# Patient Record
Sex: MALE | Race: BLACK OR AFRICAN AMERICAN
[De-identification: names, ages, dates, MRNs, and addresses within clinical notes are randomized per-mention and may not be internally consistent; named-entity substitution may affect disease eponyms.]

---

## 2017-03-23 ENCOUNTER — HOSPITAL ENCOUNTER (EMERGENCY)
Dept: HOSPITAL 10 - FTE | Age: 52
Discharge: HOME | End: 2017-03-23
Payer: COMMERCIAL

## 2017-03-23 VITALS
TEMPERATURE: 98.4 F | DIASTOLIC BLOOD PRESSURE: 98 MMHG | SYSTOLIC BLOOD PRESSURE: 167 MMHG | HEART RATE: 61 BPM | RESPIRATION RATE: 18 BRPM

## 2017-03-23 VITALS
HEIGHT: 71 IN | WEIGHT: 188.5 LBS | BODY MASS INDEX: 26.39 KG/M2 | BODY MASS INDEX: 26.39 KG/M2 | HEIGHT: 71 IN | WEIGHT: 188.5 LBS

## 2017-03-23 DIAGNOSIS — M19.072: Primary | ICD-10-CM

## 2017-03-23 DIAGNOSIS — F17.210: ICD-10-CM

## 2017-03-23 DIAGNOSIS — I10: ICD-10-CM

## 2017-03-23 PROCEDURE — 99283 EMERGENCY DEPT VISIT LOW MDM: CPT

## 2017-03-23 NOTE — ERD
ER Documentation


Chief Complaint


Date/Time


DATE: 3/23/17 


TIME: 20:02


Chief Complaint


LEFT ANKLE PAIN AND SWELLING.Pt ACCIDENTALLY TWISTED HIS ANKLE.





HPI


52-year-old man with a history of left ankle pain complains of increasing pain 

to the left ankle similar to previous episodes.  He does have a long history of 

left ankle injury and usually ambulates with a walker.  He denies recent trauma 

but ran out of his analgesics.  He denies redness or swelling to the ankle or 

foot, no fevers or chills, no paresis or paresthesias.  He states he was in the 

 and injured his ankle many years ago.  No history of surgery.





ROS


All systems reviewed and are negative except as per history of present illness.





Medications


Home Meds


Active Scripts


Oxycodone HCl/Acetaminophen (Percocet 5-325 mg Tablet) 1 Each Tablet, 1 EACH PO 

TID for PAIN, #12 TAB


   Prov:AMMON NESS MD         3/23/17





PMhx/Soc


Chronic pain


History of Surgery:  Yes (L ankle fx repair)


Anesthesia Reaction:  No


Hx Neurological Disorder:  No


Hx Cardiac Disorders:  Yes (HTN)


Hx Psychiatric Problems:  Yes (PTSD)


Hx Miscellaneous Medical Probl:  Yes (Chronic pain)


Hx Alcohol Use:  No


Hx Substance Use:  No


Hx Tobacco Use:  Yes


Smoking Status:  Current every day smoker





FmHx


Family History:  No diabetes





Physical Exam


Vitals





Vital Signs








  Date Time  Temp Pulse Resp B/P Pulse Ox O2 Delivery O2 Flow Rate FiO2


 


3/23/17 18:09 98.4 77 18 146/93 98   








Physical Exam


GENERAL: Well-developed, well-nourished, well-hydrated, in no apparent distress

, looks nontoxic in appearance


HEENT: Moist mucous membranes, pink conjunctiva, no cervical spine tenderness 

or step-off deformities, no goiter, no jaundice or icterus, extraocular 

movements intact without pain. No submandibular induration, and no pharyngeal 

erythema


NEURO: Alert and oriented 3, cranial nerves II through XII intact bilaterally, 

pupils equal round reactive to light, no focal deficits or facial asymmetry, 

sensation intact distally Strength 5/5 in upper and lower extremities 

bilaterally


CARDIAC: Regular rate and rhythm, no murmurs rubs or gallops


LUNGS: Clear bilaterally no wheezing crackles or stridor


ABDOMEN: Soft nontender, no guarding, no rigidity, no rebound, no psoas sign no 

obturator sign. Normoactive bowel sounds


SKIN: Warm and dry to touch, no abrasions, contusions, or hematomas, no 

lacerations, no ecchymosis, no target lesions, and without ulcers


EXTREMITIES: No clubbing cyanosis or edema, calves are bilaterally symmetrical, 

no Homans sign, no popliteal cord sign. Distal pulses equal and bilateral


PSYCH: Normal affect without agitation or irritability


Results 24 hrs





 Current Medications








 Medications


  (Trade)  Dose


 Ordered  Sig/Jerson


 Route


 PRN Reason  Start Time


 Stop Time Status Last Admin


Dose Admin


 


 Oxycodone/


 Acetaminophen


  (Percocet (5/


 325))  1 tab  ONCE  ONCE


 PO


   3/23/17 20:00


 3/23/17 20:01 DC 3/23/17 20:01


 











Procedures/MDM


I administered Percocet 1 tablet p.o. for pain control.





Patient has no soft tissue swelling or bony tenderness noted on exam, no 

erythema.  He states the pain is chronic and recurrent.  I suspect arthritis 

secondary to previous injury and gave him a short prescription of Percocet to 

use as needed for severe pain.  I did tell him to return if he develops fever, 

redness, or sustains any new injury.





Patient feels much better at this time, and vital signs are normal, symptoms 

have improved. I did give strict instructions to return to the ED if symptoms 

continue or worsen, patient will otherwise follow-up with primary care 

physician. Patient understood instructions and agreed to plan.





Departure


Diagnosis:  


 Primary Impression:  


 Arthritis


 Additional Impression:  


 Chronic ankle pain


 Laterality:  left  Qualified Code:  M25.572 - Chronic pain of left ankle


Condition:  Good


Patient Instructions:  What Is Arthritis?, Chronic Pain











AMMON NESS MD Mar 23, 2017 20:05

## 2017-07-05 ENCOUNTER — HOSPITAL ENCOUNTER (EMERGENCY)
Dept: HOSPITAL 10 - E/R | Age: 52
Discharge: LEFT BEFORE DISCHARGE | End: 2017-07-05
Payer: SELF-PAY

## 2017-07-05 DIAGNOSIS — Z53.21: Primary | ICD-10-CM

## 2018-07-24 ENCOUNTER — HOSPITAL ENCOUNTER (EMERGENCY)
Age: 53
Discharge: HOME | End: 2018-07-24

## 2018-07-24 ENCOUNTER — HOSPITAL ENCOUNTER (EMERGENCY)
Dept: HOSPITAL 91 - FTE | Age: 53
Discharge: HOME | End: 2018-07-24
Payer: COMMERCIAL

## 2018-07-24 DIAGNOSIS — R51: Primary | ICD-10-CM

## 2018-07-24 DIAGNOSIS — F17.210: ICD-10-CM

## 2018-07-24 DIAGNOSIS — I10: ICD-10-CM

## 2018-07-24 DIAGNOSIS — R11.0: ICD-10-CM

## 2018-07-24 LAB
ADD MAN DIFF?: NO
ANION GAP: 14 (ref 8–16)
BASOPHIL #: 0 10^3/UL (ref 0–0.1)
BASOPHILS %: 0.2 % (ref 0–2)
BLOOD UREA NITROGEN: 15 MG/DL (ref 7–20)
CALCIUM: 9.2 MG/DL (ref 8.4–10.2)
CARBON DIOXIDE: 26 MMOL/L (ref 21–31)
CHLORIDE: 104 MMOL/L (ref 97–110)
CREATININE: 1.03 MG/DL (ref 0.61–1.24)
EOSINOPHILS #: 0.7 10^3/UL (ref 0–0.5)
EOSINOPHILS %: 7.3 % (ref 0–7)
GLUCOSE: 80 MG/DL (ref 70–220)
HEMATOCRIT: 43.7 % (ref 42–52)
HEMOGLOBIN: 15.4 G/DL (ref 14–18)
LYMPHOCYTES #: 2.4 10^3/UL (ref 0.8–2.9)
LYMPHOCYTES %: 26.6 % (ref 15–51)
MEAN CORPUSCULAR HEMOGLOBIN: 31.1 PG (ref 29–33)
MEAN CORPUSCULAR HGB CONC: 35.2 G/DL (ref 32–37)
MEAN CORPUSCULAR VOLUME: 88.3 FL (ref 82–101)
MEAN PLATELET VOLUME: 8.4 FL (ref 7.4–10.4)
MONOCYTE #: 0.7 10^3/UL (ref 0.3–0.9)
MONOCYTES %: 7.7 % (ref 0–11)
NEUTROPHIL #: 5.3 10^3/UL (ref 1.6–7.5)
NEUTROPHILS %: 57.9 % (ref 39–77)
NUCLEATED RED BLOOD CELLS #: 0 10^3/UL (ref 0–0)
NUCLEATED RED BLOOD CELLS%: 0 /100WBC (ref 0–0)
PLATELET COUNT: 250 10^3/UL (ref 140–415)
POTASSIUM: 3.6 MMOL/L (ref 3.5–5.1)
RED BLOOD COUNT: 4.95 10^6/UL (ref 4.7–6.1)
RED CELL DISTRIBUTION WIDTH: 13.7 % (ref 11.5–14.5)
SODIUM: 140 MMOL/L (ref 135–144)
URINE PH (DIP) POC: 6 (ref 5–8.5)
URINE TOTAL PROTEIN POC: (no result)
WHITE BLOOD COUNT: 9.1 10^3/UL (ref 4.8–10.8)

## 2018-07-24 PROCEDURE — 36415 COLL VENOUS BLD VENIPUNCTURE: CPT

## 2018-07-24 PROCEDURE — 70450 CT HEAD/BRAIN W/O DYE: CPT

## 2018-07-24 PROCEDURE — 96374 THER/PROPH/DIAG INJ IV PUSH: CPT

## 2018-07-24 PROCEDURE — 96361 HYDRATE IV INFUSION ADD-ON: CPT

## 2018-07-24 PROCEDURE — 81003 URINALYSIS AUTO W/O SCOPE: CPT

## 2018-07-24 PROCEDURE — 96375 TX/PRO/DX INJ NEW DRUG ADDON: CPT

## 2018-07-24 PROCEDURE — 99285 EMERGENCY DEPT VISIT HI MDM: CPT

## 2018-07-24 PROCEDURE — 85025 COMPLETE CBC W/AUTO DIFF WBC: CPT

## 2018-07-24 PROCEDURE — 80048 BASIC METABOLIC PNL TOTAL CA: CPT

## 2018-07-24 RX ADMIN — THIAMINE HYDROCHLORIDE 1 MLS/HR: 100 INJECTION, SOLUTION INTRAMUSCULAR; INTRAVENOUS at 18:55

## 2018-07-24 RX ADMIN — ONDANSETRON HYDROCHLORIDE 1 MG: 2 INJECTION, SOLUTION INTRAMUSCULAR; INTRAVENOUS at 18:54

## 2018-09-16 ENCOUNTER — HOSPITAL ENCOUNTER (INPATIENT)
Dept: HOSPITAL 91 - E/R | Age: 53
LOS: 2 days | Discharge: HOME | DRG: 201 | End: 2018-09-18
Payer: COMMERCIAL

## 2018-09-16 ENCOUNTER — HOSPITAL ENCOUNTER (INPATIENT)
Age: 53
LOS: 2 days | Discharge: HOME | DRG: 201 | End: 2018-09-18

## 2018-09-16 DIAGNOSIS — E03.9: ICD-10-CM

## 2018-09-16 DIAGNOSIS — I10: ICD-10-CM

## 2018-09-16 DIAGNOSIS — J93.83: Primary | ICD-10-CM

## 2018-09-16 DIAGNOSIS — J43.9: ICD-10-CM

## 2018-09-16 DIAGNOSIS — F17.210: ICD-10-CM

## 2018-09-16 DIAGNOSIS — E78.5: ICD-10-CM

## 2018-09-16 LAB
ADD MAN DIFF?: NO
ALANINE AMINOTRANSFERASE: 28 IU/L (ref 13–69)
ALBUMIN/GLOBULIN RATIO: 1.37
ALBUMIN: 4.4 G/DL (ref 3.3–4.9)
ALKALINE PHOSPHATASE: 63 IU/L (ref 42–121)
ANION GAP: 13 (ref 8–16)
ASPARTATE AMINO TRANSFERASE: 39 IU/L (ref 15–46)
BASOPHIL #: 0 10^3/UL (ref 0–0.1)
BASOPHILS %: 0.2 % (ref 0–2)
BILIRUBIN,DIRECT: 0 MG/DL (ref 0–0.2)
BILIRUBIN,TOTAL: 0.4 MG/DL (ref 0.2–1.3)
BLOOD UREA NITROGEN: 12 MG/DL (ref 7–20)
CALCIUM: 9.5 MG/DL (ref 8.4–10.2)
CARBON DIOXIDE: 29 MMOL/L (ref 21–31)
CHLORIDE: 104 MMOL/L (ref 97–110)
CREATININE: 1.06 MG/DL (ref 0.61–1.24)
D-DIMER: 450.97 NG/ML (ref ?–460)
EOSINOPHILS #: 0.5 10^3/UL (ref 0–0.5)
EOSINOPHILS %: 5.2 % (ref 0–7)
GLOBULIN: 3.2 G/DL (ref 1.3–3.2)
GLUCOSE: 136 MG/DL (ref 70–220)
HEMATOCRIT: 44.6 % (ref 42–52)
HEMOGLOBIN: 15.6 G/DL (ref 14–18)
IMMATURE GRANS #M: 0.01 10^3/UL (ref 0–0.03)
IMMATURE GRANS % (M): 0.1 % (ref 0–0.43)
LIPASE: 77 U/L (ref 23–300)
LYMPHOCYTES #: 2.5 10^3/UL (ref 0.8–2.9)
LYMPHOCYTES %: 28.5 % (ref 15–51)
MEAN CORPUSCULAR HEMOGLOBIN: 30.2 PG (ref 29–33)
MEAN CORPUSCULAR HGB CONC: 35 G/DL (ref 32–37)
MEAN CORPUSCULAR VOLUME: 86.3 FL (ref 82–101)
MEAN PLATELET VOLUME: 8.7 FL (ref 7.4–10.4)
MONOCYTE #: 0.6 10^3/UL (ref 0.3–0.9)
MONOCYTES %: 6.6 % (ref 0–11)
NEUTROPHIL #: 5.3 10^3/UL (ref 1.6–7.5)
NEUTROPHILS %: 59.4 % (ref 39–77)
NUCLEATED RED BLOOD CELLS #: 0 10^3/UL (ref 0–0)
NUCLEATED RED BLOOD CELLS%: 0 /100WBC (ref 0–0)
PLATELET COUNT: 248 10^3/UL (ref 140–415)
POTASSIUM: 3.5 MMOL/L (ref 3.5–5.1)
RED BLOOD COUNT: 5.17 10^6/UL (ref 4.7–6.1)
RED CELL DISTRIBUTION WIDTH: 13.1 % (ref 11.5–14.5)
SODIUM: 142 MMOL/L (ref 135–144)
TOTAL PROTEIN: 7.6 G/DL (ref 6.1–8.1)
TROPONIN-I: < 0.012 NG/ML (ref 0–0.12)
WHITE BLOOD COUNT: 8.9 10^3/UL (ref 4.8–10.8)

## 2018-09-16 PROCEDURE — 36415 COLL VENOUS BLD VENIPUNCTURE: CPT

## 2018-09-16 PROCEDURE — 71045 X-RAY EXAM CHEST 1 VIEW: CPT

## 2018-09-16 PROCEDURE — 80053 COMPREHEN METABOLIC PANEL: CPT

## 2018-09-16 PROCEDURE — 71250 CT THORAX DX C-: CPT

## 2018-09-16 PROCEDURE — 85378 FIBRIN DEGRADE SEMIQUANT: CPT

## 2018-09-16 PROCEDURE — 83735 ASSAY OF MAGNESIUM: CPT

## 2018-09-16 PROCEDURE — 85025 COMPLETE CBC W/AUTO DIFF WBC: CPT

## 2018-09-16 PROCEDURE — 96374 THER/PROPH/DIAG INJ IV PUSH: CPT

## 2018-09-16 PROCEDURE — 83036 HEMOGLOBIN GLYCOSYLATED A1C: CPT

## 2018-09-16 PROCEDURE — 93005 ELECTROCARDIOGRAM TRACING: CPT

## 2018-09-16 PROCEDURE — 99285 EMERGENCY DEPT VISIT HI MDM: CPT

## 2018-09-16 PROCEDURE — 84484 ASSAY OF TROPONIN QUANT: CPT

## 2018-09-16 PROCEDURE — 84100 ASSAY OF PHOSPHORUS: CPT

## 2018-09-16 PROCEDURE — 80048 BASIC METABOLIC PNL TOTAL CA: CPT

## 2018-09-16 PROCEDURE — 83690 ASSAY OF LIPASE: CPT

## 2018-09-16 RX ADMIN — GABAPENTIN 1 MG: 300 CAPSULE ORAL at 21:57

## 2018-09-16 RX ADMIN — METOPROLOL TARTRATE 1 MG: 50 TABLET, FILM COATED ORAL at 20:52

## 2018-09-16 RX ADMIN — ATORVASTATIN CALCIUM 1 MG: 80 TABLET, FILM COATED ORAL at 20:49

## 2018-09-16 RX ADMIN — MIRTAZAPINE 1 MG: 15 TABLET, FILM COATED ORAL at 20:48

## 2018-09-16 RX ADMIN — THIAMINE HYDROCHLORIDE 1 MLS/HR: 100 INJECTION, SOLUTION INTRAMUSCULAR; INTRAVENOUS at 14:54

## 2018-09-16 RX ADMIN — KETOROLAC TROMETHAMINE 1 MG: 15 INJECTION, SOLUTION INTRAMUSCULAR; INTRAVENOUS at 14:54

## 2018-09-16 RX ADMIN — QUETIAPINE FUMARATE 1 MG: 100 TABLET, FILM COATED ORAL at 20:48

## 2018-09-16 RX ADMIN — HYDROMORPHONE HYDROCHLORIDE 1 MG: 2 INJECTION, SOLUTION INTRAMUSCULAR; INTRAVENOUS; SUBCUTANEOUS at 18:41

## 2018-09-16 RX ADMIN — VITAMIN D, TAB 1000IU (100/BT) 1 UNIT: 25 TAB at 20:49

## 2018-09-16 RX ADMIN — HYDROCODONE BITARTRATE AND ACETAMINOPHEN 1 TAB: 5; 325 TABLET ORAL at 21:57

## 2018-09-17 LAB
ADD MAN DIFF?: NO
ANION GAP: 9 (ref 8–16)
BASOPHIL #: 0 10^3/UL (ref 0–0.1)
BASOPHILS %: 0.2 % (ref 0–2)
BLOOD UREA NITROGEN: 14 MG/DL (ref 7–20)
CALCIUM: 8.8 MG/DL (ref 8.4–10.2)
CARBON DIOXIDE: 30 MMOL/L (ref 21–31)
CHLORIDE: 109 MMOL/L (ref 97–110)
CREATININE: 1.03 MG/DL (ref 0.61–1.24)
EOSINOPHILS #: 0.5 10^3/UL (ref 0–0.5)
EOSINOPHILS %: 5.8 % (ref 0–7)
GLUCOSE: 109 MG/DL (ref 70–220)
HEMATOCRIT: 41.9 % (ref 42–52)
HEMOGLOBIN A1C: 5.9 % (ref 0–5.9)
HEMOGLOBIN: 14.2 G/DL (ref 14–18)
IMMATURE GRANS #M: 0.03 10^3/UL (ref 0–0.03)
IMMATURE GRANS % (M): 0.3 % (ref 0–0.43)
LYMPHOCYTES #: 2.1 10^3/UL (ref 0.8–2.9)
LYMPHOCYTES %: 23.5 % (ref 15–51)
MAGNESIUM: 2.1 MG/DL (ref 1.7–2.5)
MEAN CORPUSCULAR HEMOGLOBIN: 30.1 PG (ref 29–33)
MEAN CORPUSCULAR HGB CONC: 33.9 G/DL (ref 32–37)
MEAN CORPUSCULAR VOLUME: 88.8 FL (ref 82–101)
MEAN PLATELET VOLUME: 8.8 FL (ref 7.4–10.4)
MONOCYTE #: 0.6 10^3/UL (ref 0.3–0.9)
MONOCYTES %: 6.6 % (ref 0–11)
NEUTROPHIL #: 5.5 10^3/UL (ref 1.6–7.5)
NEUTROPHILS %: 63.6 % (ref 39–77)
NUCLEATED RED BLOOD CELLS #: 0 10^3/UL (ref 0–0)
NUCLEATED RED BLOOD CELLS%: 0 /100WBC (ref 0–0)
PHOSPHORUS: 4.4 MG/DL (ref 2.5–4.9)
PLATELET COUNT: 220 10^3/UL (ref 140–415)
POTASSIUM: 3.5 MMOL/L (ref 3.5–5.1)
RED BLOOD COUNT: 4.72 10^6/UL (ref 4.7–6.1)
RED CELL DISTRIBUTION WIDTH: 13.4 % (ref 11.5–14.5)
SODIUM: 144 MMOL/L (ref 135–144)
WHITE BLOOD COUNT: 8.7 10^3/UL (ref 4.8–10.8)

## 2018-09-17 RX ADMIN — HYDROCHLOROTHIAZIDE 1 MG: 25 TABLET ORAL at 09:16

## 2018-09-17 RX ADMIN — QUETIAPINE FUMARATE 1 MG: 100 TABLET, FILM COATED ORAL at 20:29

## 2018-09-17 RX ADMIN — LEVOTHYROXINE SODIUM 1 MCG: 50 TABLET ORAL at 06:32

## 2018-09-17 RX ADMIN — SENNOSIDES 1 TAB: 8.6 TABLET, FILM COATED ORAL at 13:54

## 2018-09-17 RX ADMIN — VITAMIN D, TAB 1000IU (100/BT) 1 UNIT: 25 TAB at 20:29

## 2018-09-17 RX ADMIN — VITAMIN D, TAB 1000IU (100/BT) 1 UNIT: 25 TAB at 09:15

## 2018-09-17 RX ADMIN — ATORVASTATIN CALCIUM 1 MG: 80 TABLET, FILM COATED ORAL at 20:29

## 2018-09-17 RX ADMIN — METOPROLOL TARTRATE 1 MG: 50 TABLET, FILM COATED ORAL at 09:00

## 2018-09-17 RX ADMIN — HYDROCODONE BITARTRATE AND ACETAMINOPHEN 1 TAB: 5; 325 TABLET ORAL at 09:53

## 2018-09-17 RX ADMIN — NIFEDIPINE 1 MG: 90 TABLET, FILM COATED, EXTENDED RELEASE ORAL at 09:16

## 2018-09-17 RX ADMIN — GABAPENTIN 1 MG: 300 CAPSULE ORAL at 13:05

## 2018-09-17 RX ADMIN — HYDROCODONE BITARTRATE AND ACETAMINOPHEN 1 TAB: 5; 325 TABLET ORAL at 15:52

## 2018-09-17 RX ADMIN — GABAPENTIN 1 MG: 300 CAPSULE ORAL at 09:15

## 2018-09-17 RX ADMIN — DOCUSATE SODIUM 1 MG: 100 CAPSULE, LIQUID FILLED ORAL at 13:54

## 2018-09-17 RX ADMIN — MIRTAZAPINE 1 MG: 15 TABLET, FILM COATED ORAL at 20:29

## 2018-09-17 RX ADMIN — GABAPENTIN 1 MG: 300 CAPSULE ORAL at 20:29

## 2018-09-17 RX ADMIN — METOPROLOL TARTRATE 1 MG: 50 TABLET, FILM COATED ORAL at 20:29

## 2018-09-17 RX ADMIN — LISINOPRIL 1 MG: 20 TABLET ORAL at 09:16

## 2018-09-17 RX ADMIN — ACETAMINOPHEN 1 MG: 325 TABLET, FILM COATED ORAL at 14:34

## 2018-09-18 RX ADMIN — VITAMIN D, TAB 1000IU (100/BT) 1 UNIT: 25 TAB at 07:46

## 2018-09-18 RX ADMIN — HYDROCODONE BITARTRATE AND ACETAMINOPHEN 1 TAB: 5; 325 TABLET ORAL at 16:19

## 2018-09-18 RX ADMIN — GABAPENTIN 1 MG: 300 CAPSULE ORAL at 07:46

## 2018-09-18 RX ADMIN — HYDROCHLOROTHIAZIDE 1 MG: 25 TABLET ORAL at 07:46

## 2018-09-18 RX ADMIN — DOCUSATE SODIUM 1 MG: 100 CAPSULE, LIQUID FILLED ORAL at 07:46

## 2018-09-18 RX ADMIN — NIFEDIPINE 1 MG: 90 TABLET, FILM COATED, EXTENDED RELEASE ORAL at 07:46

## 2018-09-18 RX ADMIN — LISINOPRIL 1 MG: 20 TABLET ORAL at 07:46

## 2018-09-18 RX ADMIN — LEVOTHYROXINE SODIUM 1 MCG: 50 TABLET ORAL at 05:27

## 2018-09-18 RX ADMIN — HYDROCODONE BITARTRATE AND ACETAMINOPHEN 1 TAB: 5; 325 TABLET ORAL at 07:47

## 2018-09-18 RX ADMIN — METOPROLOL TARTRATE 1 MG: 50 TABLET, FILM COATED ORAL at 07:47

## 2018-09-18 RX ADMIN — GABAPENTIN 1 MG: 300 CAPSULE ORAL at 12:28

## 2018-10-06 ENCOUNTER — HOSPITAL ENCOUNTER (INPATIENT)
Dept: HOSPITAL 91 - E/R | Age: 53
LOS: 3 days | Discharge: HOME | DRG: 191 | End: 2018-10-09
Payer: COMMERCIAL

## 2018-10-06 DIAGNOSIS — I10: ICD-10-CM

## 2018-10-06 DIAGNOSIS — E03.9: ICD-10-CM

## 2018-10-06 DIAGNOSIS — Z87.891: ICD-10-CM

## 2018-10-06 DIAGNOSIS — E78.5: ICD-10-CM

## 2018-10-06 DIAGNOSIS — J43.9: Primary | ICD-10-CM

## 2018-10-06 DIAGNOSIS — J93.12: ICD-10-CM

## 2018-10-06 LAB
ADD MAN DIFF?: NO
ALANINE AMINOTRANSFERASE: 46 IU/L (ref 13–69)
ALBUMIN/GLOBULIN RATIO: 1.08
ALBUMIN: 3.8 G/DL (ref 3.3–4.9)
ALKALINE PHOSPHATASE: 76 IU/L (ref 42–121)
ALLEN TEST: (no result)
ANION GAP: 12 (ref 8–16)
ARTERIAL BASE EXCESS: 4.3 MMOL/L (ref -3–3)
ARTERIAL BLOOD GAS OXYGEN SAT: 99.3 MMHG (ref 95–98)
ARTERIAL COHB: 0.6 % (ref 0–3)
ARTERIAL FRACTION OF OXYHGB: 98.4 % (ref 93–99)
ARTERIAL HCO3: 26.9 MMOL/L (ref 22–26)
ARTERIAL METHB: 0.3 % (ref 0–1.5)
ARTERIAL PCO2: 34.3 MMHG (ref 35–45)
ARTERIAL TOTAL HEMGLOBIN: 15 G/DL (ref 12–18)
ASPARTATE AMINO TRANSFERASE: 24 IU/L (ref 15–46)
B-TYPE NATRIURETIC PEPTIDE: 133 PG/ML (ref 0–125)
BASOPHIL #: 0 10^3/UL (ref 0–0.1)
BASOPHILS %: 0.2 % (ref 0–2)
BILIRUBIN,DIRECT: 0 MG/DL (ref 0–0.2)
BILIRUBIN,TOTAL: 0.5 MG/DL (ref 0.2–1.3)
BLOOD UREA NITROGEN: 9 MG/DL (ref 7–20)
CALCIUM: 9.7 MG/DL (ref 8.4–10.2)
CARBON DIOXIDE: 31 MMOL/L (ref 21–31)
CHLORIDE: 100 MMOL/L (ref 97–110)
CK INDEX: 0.2
CK-MB: < 0.22 NG/ML (ref 0–2.4)
CREATINE KINASE: 106 IU/L (ref 23–200)
CREATININE: 1.06 MG/DL (ref 0.61–1.24)
EOSINOPHILS #: 0.7 10^3/UL (ref 0–0.5)
EOSINOPHILS %: 5.6 % (ref 0–7)
FIO2: 40 %
GLOBULIN: 3.5 G/DL (ref 1.3–3.2)
GLUCOSE: 89 MG/DL (ref 70–220)
HEMATOCRIT: 42.8 % (ref 42–52)
HEMOGLOBIN: 14.9 G/DL (ref 14–18)
IMMATURE GRANS #M: 0.04 10^3/UL (ref 0–0.03)
IMMATURE GRANS % (M): 0.3 % (ref 0–0.43)
INR: 0.92
LYMPHOCYTES #: 3.3 10^3/UL (ref 0.8–2.9)
LYMPHOCYTES %: 25 % (ref 15–51)
MAGNESIUM: 2 MG/DL (ref 1.7–2.5)
MEAN CORPUSCULAR HEMOGLOBIN: 29.8 PG (ref 29–33)
MEAN CORPUSCULAR HGB CONC: 34.8 G/DL (ref 32–37)
MEAN CORPUSCULAR VOLUME: 85.6 FL (ref 82–101)
MEAN PLATELET VOLUME: 8.6 FL (ref 7.4–10.4)
MODE: (no result)
MONOCYTE #: 1.2 10^3/UL (ref 0.3–0.9)
MONOCYTES %: 9 % (ref 0–11)
NEUTROPHIL #: 7.9 10^3/UL (ref 1.6–7.5)
NEUTROPHILS %: 59.9 % (ref 39–77)
NUCLEATED RED BLOOD CELLS #: 0 10^3/UL (ref 0–0)
NUCLEATED RED BLOOD CELLS%: 0 /100WBC (ref 0–0)
O2 A-A PPRESDIFF RESPIRATORY: 67.2 MMHG (ref 7–24)
PARTIAL THROMBOPLASTIN TIME: 27.6 SEC (ref 23–35)
PLATELET COUNT: 244 10^3/UL (ref 140–415)
POTASSIUM: 3 MMOL/L (ref 3.5–5.1)
PROTIME: 12.4 SEC (ref 11.9–14.9)
PT RATIO: 1
RED BLOOD COUNT: 5 10^6/UL (ref 4.7–6.1)
RED CELL DISTRIBUTION WIDTH: 13.1 % (ref 11.5–14.5)
SODIUM: 140 MMOL/L (ref 135–144)
TOTAL PROTEIN: 7.3 G/DL (ref 6.1–8.1)
TROPONIN-I: < 0.012 NG/ML (ref 0–0.12)
WHITE BLOOD COUNT: 13.2 10^3/UL (ref 4.8–10.8)

## 2018-10-06 PROCEDURE — 87081 CULTURE SCREEN ONLY: CPT

## 2018-10-06 PROCEDURE — 85025 COMPLETE CBC W/AUTO DIFF WBC: CPT

## 2018-10-06 PROCEDURE — 71250 CT THORAX DX C-: CPT

## 2018-10-06 PROCEDURE — 82550 ASSAY OF CK (CPK): CPT

## 2018-10-06 PROCEDURE — 99285 EMERGENCY DEPT VISIT HI MDM: CPT

## 2018-10-06 PROCEDURE — 36600 WITHDRAWAL OF ARTERIAL BLOOD: CPT

## 2018-10-06 PROCEDURE — 80048 BASIC METABOLIC PNL TOTAL CA: CPT

## 2018-10-06 PROCEDURE — 80053 COMPREHEN METABOLIC PANEL: CPT

## 2018-10-06 PROCEDURE — 83880 ASSAY OF NATRIURETIC PEPTIDE: CPT

## 2018-10-06 PROCEDURE — 82803 BLOOD GASES ANY COMBINATION: CPT

## 2018-10-06 PROCEDURE — 85730 THROMBOPLASTIN TIME PARTIAL: CPT

## 2018-10-06 PROCEDURE — 94664 DEMO&/EVAL PT USE INHALER: CPT

## 2018-10-06 PROCEDURE — 85610 PROTHROMBIN TIME: CPT

## 2018-10-06 PROCEDURE — 96375 TX/PRO/DX INJ NEW DRUG ADDON: CPT

## 2018-10-06 PROCEDURE — 96374 THER/PROPH/DIAG INJ IV PUSH: CPT

## 2018-10-06 PROCEDURE — 71045 X-RAY EXAM CHEST 1 VIEW: CPT

## 2018-10-06 PROCEDURE — 82553 CREATINE MB FRACTION: CPT

## 2018-10-06 PROCEDURE — 84484 ASSAY OF TROPONIN QUANT: CPT

## 2018-10-06 PROCEDURE — 84100 ASSAY OF PHOSPHORUS: CPT

## 2018-10-06 PROCEDURE — 83036 HEMOGLOBIN GLYCOSYLATED A1C: CPT

## 2018-10-06 PROCEDURE — 83735 ASSAY OF MAGNESIUM: CPT

## 2018-10-06 RX ADMIN — MIRTAZAPINE 1 MG: 15 TABLET, FILM COATED ORAL at 22:16

## 2018-10-06 RX ADMIN — MORPHINE SULFATE 1 MG: 2 INJECTION, SOLUTION INTRAMUSCULAR; INTRAVENOUS at 20:34

## 2018-10-06 RX ADMIN — HYDRALAZINE HYDROCHLORIDE 1 MG: 20 INJECTION INTRAMUSCULAR; INTRAVENOUS at 22:17

## 2018-10-06 RX ADMIN — METOPROLOL TARTRATE 1 MG: 50 TABLET, FILM COATED ORAL at 21:00

## 2018-10-06 RX ADMIN — IPRATROPIUM BROMIDE 1 MG: 0.5 SOLUTION RESPIRATORY (INHALATION) at 17:07

## 2018-10-06 RX ADMIN — ALBUTEROL SULFATE 1 MG: 2.5 SOLUTION RESPIRATORY (INHALATION) at 17:06

## 2018-10-06 RX ADMIN — QUETIAPINE FUMARATE 1 MG: 100 TABLET, FILM COATED ORAL at 22:16

## 2018-10-06 RX ADMIN — ONDANSETRON HYDROCHLORIDE 1 MG: 2 INJECTION, SOLUTION INTRAMUSCULAR; INTRAVENOUS at 17:36

## 2018-10-06 RX ADMIN — ATORVASTATIN CALCIUM 1 MG: 80 TABLET, FILM COATED ORAL at 22:16

## 2018-10-06 RX ADMIN — POTASSIUM CHLORIDE 1 MEQ: 1500 TABLET, EXTENDED RELEASE ORAL at 22:17

## 2018-10-07 LAB
ADD MAN DIFF?: NO
ANION GAP: 10 (ref 8–16)
BASOPHIL #: 0 10^3/UL (ref 0–0.1)
BASOPHILS %: 0.2 % (ref 0–2)
BLOOD UREA NITROGEN: 9 MG/DL (ref 7–20)
CALCIUM: 9 MG/DL (ref 8.4–10.2)
CARBON DIOXIDE: 32 MMOL/L (ref 21–31)
CHLORIDE: 107 MMOL/L (ref 97–110)
CREATININE: 1.03 MG/DL (ref 0.61–1.24)
EOSINOPHILS #: 0.6 10^3/UL (ref 0–0.5)
EOSINOPHILS %: 8.7 % (ref 0–7)
GLUCOSE: 110 MG/DL (ref 70–220)
HEMATOCRIT: 40 % (ref 42–52)
HEMOGLOBIN A1C: 5.8 % (ref 0–5.9)
HEMOGLOBIN: 13.6 G/DL (ref 14–18)
IMMATURE GRANS #M: 0.01 10^3/UL (ref 0–0.03)
IMMATURE GRANS % (M): 0.2 % (ref 0–0.43)
LYMPHOCYTES #: 2 10^3/UL (ref 0.8–2.9)
LYMPHOCYTES %: 29.6 % (ref 15–51)
MAGNESIUM: 2.1 MG/DL (ref 1.7–2.5)
MEAN CORPUSCULAR HEMOGLOBIN: 29.8 PG (ref 29–33)
MEAN CORPUSCULAR HGB CONC: 34 G/DL (ref 32–37)
MEAN CORPUSCULAR VOLUME: 87.5 FL (ref 82–101)
MEAN PLATELET VOLUME: 8.9 FL (ref 7.4–10.4)
MONOCYTE #: 0.6 10^3/UL (ref 0.3–0.9)
MONOCYTES %: 8.8 % (ref 0–11)
NEUTROPHIL #: 3.5 10^3/UL (ref 1.6–7.5)
NEUTROPHILS %: 52.5 % (ref 39–77)
NUCLEATED RED BLOOD CELLS #: 0 10^3/UL (ref 0–0)
NUCLEATED RED BLOOD CELLS%: 0 /100WBC (ref 0–0)
PHOSPHORUS: 5.1 MG/DL (ref 2.5–4.9)
PLATELET COUNT: 223 10^3/UL (ref 140–415)
POTASSIUM: 3.6 MMOL/L (ref 3.5–5.1)
RED BLOOD COUNT: 4.57 10^6/UL (ref 4.7–6.1)
RED CELL DISTRIBUTION WIDTH: 13.4 % (ref 11.5–14.5)
SODIUM: 145 MMOL/L (ref 135–144)
WHITE BLOOD COUNT: 6.6 10^3/UL (ref 4.8–10.8)

## 2018-10-07 RX ADMIN — MORPHINE SULFATE 1 MG: 2 INJECTION, SOLUTION INTRAMUSCULAR; INTRAVENOUS at 13:41

## 2018-10-07 RX ADMIN — ATORVASTATIN CALCIUM 1 MG: 80 TABLET, FILM COATED ORAL at 20:31

## 2018-10-07 RX ADMIN — MIRTAZAPINE 1 MG: 15 TABLET, FILM COATED ORAL at 20:31

## 2018-10-07 RX ADMIN — HYDROCHLOROTHIAZIDE 1 MG: 25 TABLET ORAL at 09:45

## 2018-10-07 RX ADMIN — METOPROLOL TARTRATE 1 MG: 50 TABLET, FILM COATED ORAL at 09:44

## 2018-10-07 RX ADMIN — METOPROLOL TARTRATE 1 MG: 50 TABLET, FILM COATED ORAL at 20:31

## 2018-10-07 RX ADMIN — NIFEDIPINE 1 MG: 90 TABLET, FILM COATED, EXTENDED RELEASE ORAL at 09:45

## 2018-10-07 RX ADMIN — HYDROCODONE BITARTRATE AND ACETAMINOPHEN 1 TAB: 5; 325 TABLET ORAL at 09:43

## 2018-10-07 RX ADMIN — MORPHINE SULFATE 1 MG: 2 INJECTION, SOLUTION INTRAMUSCULAR; INTRAVENOUS at 20:32

## 2018-10-07 RX ADMIN — MORPHINE SULFATE 1 MG: 2 INJECTION, SOLUTION INTRAMUSCULAR; INTRAVENOUS at 06:09

## 2018-10-07 RX ADMIN — QUETIAPINE FUMARATE 1 MG: 100 TABLET, FILM COATED ORAL at 20:31

## 2018-10-07 RX ADMIN — LISINOPRIL 1 MG: 20 TABLET ORAL at 09:45

## 2018-10-07 RX ADMIN — LEVOTHYROXINE SODIUM 1 MCG: 50 TABLET ORAL at 06:08

## 2018-10-08 ENCOUNTER — HOSPITAL ENCOUNTER (INPATIENT)
Age: 53
LOS: 1 days | Discharge: HOME | DRG: 191 | End: 2018-10-09

## 2018-10-08 RX ADMIN — ATORVASTATIN CALCIUM 1 MG: 80 TABLET, FILM COATED ORAL at 20:21

## 2018-10-08 RX ADMIN — QUETIAPINE FUMARATE 1 MG: 100 TABLET, FILM COATED ORAL at 20:22

## 2018-10-08 RX ADMIN — METOPROLOL TARTRATE 1 MG: 50 TABLET, FILM COATED ORAL at 09:04

## 2018-10-08 RX ADMIN — NIFEDIPINE 1 MG: 90 TABLET, FILM COATED, EXTENDED RELEASE ORAL at 09:01

## 2018-10-08 RX ADMIN — LEVOTHYROXINE SODIUM 1 MCG: 50 TABLET ORAL at 06:03

## 2018-10-08 RX ADMIN — HYDROCHLOROTHIAZIDE 1 MG: 25 TABLET ORAL at 09:02

## 2018-10-08 RX ADMIN — MIRTAZAPINE 1 MG: 15 TABLET, FILM COATED ORAL at 20:22

## 2018-10-08 RX ADMIN — LISINOPRIL 1 MG: 20 TABLET ORAL at 09:02

## 2018-10-08 RX ADMIN — METOPROLOL TARTRATE 1 MG: 50 TABLET, FILM COATED ORAL at 20:22

## 2018-10-08 RX ADMIN — MORPHINE SULFATE 1 MG: 2 INJECTION, SOLUTION INTRAMUSCULAR; INTRAVENOUS at 09:24

## 2018-10-08 RX ADMIN — MORPHINE SULFATE 1 MG: 2 INJECTION, SOLUTION INTRAMUSCULAR; INTRAVENOUS at 17:23

## 2018-10-09 RX ADMIN — LEVOTHYROXINE SODIUM 1 MCG: 50 TABLET ORAL at 05:48

## 2018-10-09 RX ADMIN — NIFEDIPINE 1 MG: 90 TABLET, FILM COATED, EXTENDED RELEASE ORAL at 08:20

## 2018-10-09 RX ADMIN — METOPROLOL TARTRATE 1 MG: 50 TABLET, FILM COATED ORAL at 08:20

## 2018-10-09 RX ADMIN — LISINOPRIL 1 MG: 20 TABLET ORAL at 08:20

## 2018-10-09 RX ADMIN — MORPHINE SULFATE 1 MG: 2 INJECTION, SOLUTION INTRAMUSCULAR; INTRAVENOUS at 08:24

## 2018-10-09 RX ADMIN — HYDROCHLOROTHIAZIDE 1 MG: 25 TABLET ORAL at 08:20

## 2018-10-24 ENCOUNTER — HOSPITAL ENCOUNTER (EMERGENCY)
Age: 53
Discharge: HOME | End: 2018-10-24

## 2018-10-24 ENCOUNTER — HOSPITAL ENCOUNTER (EMERGENCY)
Dept: HOSPITAL 91 - E/R | Age: 53
Discharge: HOME | End: 2018-10-24
Payer: COMMERCIAL

## 2018-10-24 DIAGNOSIS — I10: ICD-10-CM

## 2018-10-24 DIAGNOSIS — Z87.891: ICD-10-CM

## 2018-10-24 DIAGNOSIS — J45.909: ICD-10-CM

## 2018-10-24 DIAGNOSIS — E03.9: ICD-10-CM

## 2018-10-24 DIAGNOSIS — R07.9: Primary | ICD-10-CM

## 2018-10-24 LAB
ADD MAN DIFF?: NO
ANION GAP: 10 (ref 5–13)
BASOPHIL #: 0 10^3/UL (ref 0–0.1)
BASOPHILS %: 0.2 % (ref 0–2)
BLOOD UREA NITROGEN: 13 MG/DL (ref 7–20)
CALCIUM: 9.4 MG/DL (ref 8.4–10.2)
CARBON DIOXIDE: 30 MMOL/L (ref 21–31)
CHLORIDE: 103 MMOL/L (ref 97–110)
CREATININE: 1.11 MG/DL (ref 0.61–1.24)
EOSINOPHILS #: 0.6 10^3/UL (ref 0–0.5)
EOSINOPHILS %: 6.3 % (ref 0–7)
GLUCOSE: 85 MG/DL (ref 70–220)
HEMATOCRIT: 43.3 % (ref 42–52)
HEMOGLOBIN: 15 G/DL (ref 14–18)
IMMATURE GRANS #M: 0.02 10^3/UL (ref 0–0.03)
IMMATURE GRANS % (M): 0.2 % (ref 0–0.43)
INR: 0.93
LYMPHOCYTES #: 2.4 10^3/UL (ref 0.8–2.9)
LYMPHOCYTES %: 26.9 % (ref 15–51)
MEAN CORPUSCULAR HEMOGLOBIN: 29.8 PG (ref 29–33)
MEAN CORPUSCULAR HGB CONC: 34.6 G/DL (ref 32–37)
MEAN CORPUSCULAR VOLUME: 85.9 FL (ref 82–101)
MEAN PLATELET VOLUME: 8.9 FL (ref 7.4–10.4)
MONOCYTE #: 0.8 10^3/UL (ref 0.3–0.9)
MONOCYTES %: 8.4 % (ref 0–11)
NEUTROPHIL #: 5.2 10^3/UL (ref 1.6–7.5)
NEUTROPHILS %: 58 % (ref 39–77)
NUCLEATED RED BLOOD CELLS #: 0 10^3/UL (ref 0–0)
NUCLEATED RED BLOOD CELLS%: 0 /100WBC (ref 0–0)
PARTIAL THROMBOPLASTIN TIME: 27.5 SEC (ref 23–35)
PLATELET COUNT: 224 10^3/UL (ref 140–415)
POTASSIUM: 3.5 MMOL/L (ref 3.5–5.1)
PROTIME: 12.5 SEC (ref 11.9–14.9)
PT RATIO: 1
RED BLOOD COUNT: 5.04 10^6/UL (ref 4.7–6.1)
RED CELL DISTRIBUTION WIDTH: 13.4 % (ref 11.5–14.5)
SODIUM: 143 MMOL/L (ref 135–144)
TROPONIN-I: < 0.012 NG/ML (ref 0–0.12)
TROPONIN-I: < 0.012 NG/ML (ref 0–0.12)
WHITE BLOOD COUNT: 8.9 10^3/UL (ref 4.8–10.8)

## 2018-10-24 PROCEDURE — 84484 ASSAY OF TROPONIN QUANT: CPT

## 2018-10-24 PROCEDURE — 96374 THER/PROPH/DIAG INJ IV PUSH: CPT

## 2018-10-24 PROCEDURE — 71045 X-RAY EXAM CHEST 1 VIEW: CPT

## 2018-10-24 PROCEDURE — 99285 EMERGENCY DEPT VISIT HI MDM: CPT

## 2018-10-24 PROCEDURE — 80048 BASIC METABOLIC PNL TOTAL CA: CPT

## 2018-10-24 PROCEDURE — 93005 ELECTROCARDIOGRAM TRACING: CPT

## 2018-10-24 PROCEDURE — 96375 TX/PRO/DX INJ NEW DRUG ADDON: CPT

## 2018-10-24 PROCEDURE — 85025 COMPLETE CBC W/AUTO DIFF WBC: CPT

## 2018-10-24 PROCEDURE — 71250 CT THORAX DX C-: CPT

## 2018-10-24 PROCEDURE — 85730 THROMBOPLASTIN TIME PARTIAL: CPT

## 2018-10-24 PROCEDURE — 85610 PROTHROMBIN TIME: CPT

## 2018-10-24 RX ADMIN — ONDANSETRON HYDROCHLORIDE 1 MG: 2 INJECTION, SOLUTION INTRAMUSCULAR; INTRAVENOUS at 16:39

## 2018-10-24 RX ADMIN — KETOROLAC TROMETHAMINE 1 MG: 15 INJECTION, SOLUTION INTRAMUSCULAR; INTRAVENOUS at 18:59

## 2019-07-10 ENCOUNTER — HOSPITAL ENCOUNTER (EMERGENCY)
Dept: HOSPITAL 91 - FTE | Age: 54
Discharge: HOME | End: 2019-07-10
Payer: COMMERCIAL

## 2019-07-10 ENCOUNTER — HOSPITAL ENCOUNTER (EMERGENCY)
Dept: HOSPITAL 10 - FTE | Age: 54
Discharge: HOME | End: 2019-07-10
Payer: COMMERCIAL

## 2019-07-10 VITALS — DIASTOLIC BLOOD PRESSURE: 95 MMHG | HEART RATE: 63 BPM | RESPIRATION RATE: 18 BRPM | SYSTOLIC BLOOD PRESSURE: 159 MMHG

## 2019-07-10 VITALS
BODY MASS INDEX: 23.85 KG/M2 | BODY MASS INDEX: 23.85 KG/M2 | WEIGHT: 170.35 LBS | WEIGHT: 170.35 LBS | HEIGHT: 71 IN | HEIGHT: 71 IN

## 2019-07-10 DIAGNOSIS — F17.210: ICD-10-CM

## 2019-07-10 DIAGNOSIS — J45.909: ICD-10-CM

## 2019-07-10 DIAGNOSIS — M25.561: Primary | ICD-10-CM

## 2019-07-10 PROCEDURE — 99283 EMERGENCY DEPT VISIT LOW MDM: CPT

## 2019-07-10 RX ADMIN — HYDROCODONE BITARTRATE AND ACETAMINOPHEN 1 TAB: 10; 325 TABLET ORAL at 20:58

## 2019-07-10 NOTE — ERD
ER Documentation


Chief Complaint


Chief Complaint





R knee pain x 2 days despite Tramadol; denies recent injury





HPI


54-year-old male presenting to the emergency department complaints of acute on 


chronic right knee pain for the past 2 days.  He states he was sitting in his 


recliner at home when he immediately felt 8/10 right knee pain which is worse 


with walking.  He does have history of meniscal tear with last MRI of his right 


knee approximately 6 months ago.  He denies any new trauma to the knee.  He took


tramadol 50 mg and 600 mg ibuprofen at home without significant relief.  He 


denies any numbness or tingling to the right lower extremity.  He denies any 


fevers, chills, or other symptoms at this time.





ROS


All systems reviewed and are negative except as per history of present illness.





Medications


Home Meds


Active Scripts


Naproxen* (Naprosyn*) 500 Mg Tablet, 500 MG PO BID PRN for PAIN AND/OR 


INFLAMMATION, #30 TAB


   Prov:ANNMARIE ENCINAS PA-C         7/10/19


Reported Medications


Sildenafil Citrate* (Sildenafil Citrate*) 20 Mg Tablet, 50 MG PO PRN, TAB


   9/16/18


Quetiapine Fumarate* (Quetiapine Fumarate*) 300 Mg Tablet, 300 MG PO HS, TAB


   9/16/18


Lisinopril* (Lisinopril*) 40 Mg Tablet, 40 MG PO DAILY, #30 TAB


   9/16/18


Metoprolol Tartrate* (Lopressor*) 50 Mg Tab, 50 MG PO BID, #60 TAB


   9/16/18


Mirtazapine* (Mirtazapine*) 30 Mg Tablet, 30 MG PO HS, TAB


   9/16/18


Nifedipine* (Nifedipine ER*) 90 Mg Tablet.sa, 90 MG PO DAILY, TAB.SA


   9/16/18


Diclofenac Sodium* (Voltaren* Gel) 1% -100 Gm Gel, 4 GM TOP QID, #1 TUB


   APPLY TO ANKLE AND FOOT AND AFFECTED AREA(s)


   9/16/18


Diclofenac Sodium* (Diclofenac Sodium*) 75 Mg Tablet.dr, 75 MG PO BID PRN for 


PAIN, #60 TAB


   9/16/18


Hydrochlorothiazide* (Hydrochlorothiazide*) 25 Mg Tab, 25 MG PO DAILY, #30 TAB


   9/16/18


Levothyroxine Sodium* (Levoxyl*) 50 Mcg Tablet, 50 MCG PO BEFORE BREAKFAST, #30 


TAB


   9/16/18


Albuterol Sulfate* (Ventolin HFA*) 18 Gm Hfa.aer.ad, 2 PUFF INHALATION Q4H, #1 


INHALER


   9/16/18


Atorvastatin* (Atorvastatin*) 80 Mg Tablet, 80 MG PO QHS, #30 TAB


   9/16/18





Allergies


Allergies:  


Coded Allergies:  


     No Known Allergy (Unverified , 10/24/18)





PMhx/Soc


Medical and Surgical Hx:  pt denies Medical Hx


History of Surgery:  Yes (left ankle, appendix)


Anesthesia Reaction:  No


Hx Neurological Disorder:  No


Hx Respiratory Disorders:  Yes (asthma)


Hx Cardiac Disorders:  No


Hx Psychiatric Problems:  Yes (PTSD)


Hx Miscellaneous Medical Probl:  Yes (chronic pain; right knee pain)


Hx Alcohol Use:  No


Hx Substance Use:  No


Hx Tobacco Use:  Yes


Smoking Status:  Current every day smoker





FmHx


Family History:  No diabetes





Physical Exam


Vitals





Vital Signs


  Date      Temp  Pulse  Resp  B/P (MAP)   Pulse Ox  O2          O2 Flow    FiO2


Time                                                 Delivery    Rate


   7/10/19  98.4     63    18      159/95        98  Room Air


     21:31                          (116)


   7/10/19  99.5     71    18      142/94        99


     20:04                          (110)





Physical Exam


Const:   No acute distress


Head:   Atraumatic 


Eyes:    Normal Conjunctiva


ENT:    Normal External Ears, Nose and Mouth.


Neck:               Full range of motion. No meningismus.


Resp:   No respiratory distress.


Skin:   No petechiae or rashes


Back:   No midline or flank tenderness


Ext:    No cyanosis, or edema.  Subjective tenderness palpation of the right 


anterior knee with limited range of motion secondary to pain.  The patient is 


neurovascularly intact the right lower extremity.  There is no obvious joint 


effusions or warmth noted.  No rashes of the right lower extremity.


Neur:   Awake and alert


Psych:    Normal Mood and Affect


Results 24 hrs





Current Medications


 Medications
   Dose
          Sig/Jerson
       Start Time
   Status  Last


 (Trade)       Ordered        Route
 PRN     Stop Time              Admin
Dose


                              Reason                                Admin


                1 tab          ONCE  ONCE
    7/10/19       DC           7/10/19


Acetaminophen                 PO
            21:00
                       20:58



/
                                           7/10/19 21:01


Hydrocodone


Bitart



(Norco


(10/325))








Procedures/MDM


54-year-old male presenting for acute on chronic right knee pain.  Patient 


denies any new trauma or injuries therefore imaging is not indicated at this 


time.,.  Patient was administered Norco in the department for pain and he will 


be discharged home with a prescription for naproxen.  He was advised that he 


will need close follow-up with his primary care physician and orthopedic 


physician and should return here immediately for any new or worsening or 


concerning symptoms.  Shared my medical decision making with the patient and he 


understands and agrees with the plan.  No evidence to suggest compartment 


syndrome, fracture, deep space infection, or other emergencies.





Patient's blood pressure was elevated (>120/80) but appears stable without 


evidence of hypertension emergency or urgency.  The patient is to follow-up and 


pursue outpatient monitoring and therapy with their primary care physician 


within 1 week and return immediately if they have any new, worsening, or 


concerning symptoms.





Departure


Diagnosis:  


   Primary Impression:  


   Chronic pain of right knee


Condition:  Fair


Patient Instructions:  Knee Pain, Uncertain Cause





Additional Instructions:  


Call your primary care doctor TOMORROW for an appointment during the next 1-2 


days.See the doctor sooner or return here if your condition worsens before your 


appointment time.











ANNMARIE ENCINAS PA-C       Jul 10, 2019 22:17